# Patient Record
Sex: MALE | Race: ASIAN | NOT HISPANIC OR LATINO
[De-identification: names, ages, dates, MRNs, and addresses within clinical notes are randomized per-mention and may not be internally consistent; named-entity substitution may affect disease eponyms.]

---

## 2017-07-12 ENCOUNTER — APPOINTMENT (OUTPATIENT)
Dept: CARDIOLOGY | Facility: CLINIC | Age: 62
End: 2017-07-12

## 2017-07-12 VITALS
DIASTOLIC BLOOD PRESSURE: 74 MMHG | WEIGHT: 188 LBS | SYSTOLIC BLOOD PRESSURE: 129 MMHG | RESPIRATION RATE: 18 BRPM | HEART RATE: 69 BPM | OXYGEN SATURATION: 96 % | BODY MASS INDEX: 27.53 KG/M2

## 2018-02-27 ENCOUNTER — APPOINTMENT (OUTPATIENT)
Dept: CARDIOLOGY | Facility: CLINIC | Age: 63
End: 2018-02-27

## 2018-05-16 ENCOUNTER — APPOINTMENT (OUTPATIENT)
Dept: CARDIOLOGY | Facility: CLINIC | Age: 63
End: 2018-05-16
Payer: MEDICAID

## 2018-05-16 ENCOUNTER — NON-APPOINTMENT (OUTPATIENT)
Age: 63
End: 2018-05-16

## 2018-05-16 VITALS
SYSTOLIC BLOOD PRESSURE: 120 MMHG | TEMPERATURE: 98.6 F | DIASTOLIC BLOOD PRESSURE: 65 MMHG | OXYGEN SATURATION: 96 % | WEIGHT: 198 LBS | BODY MASS INDEX: 28.99 KG/M2 | HEART RATE: 77 BPM | RESPIRATION RATE: 18 BRPM

## 2018-05-16 PROCEDURE — 99214 OFFICE O/P EST MOD 30 MIN: CPT

## 2018-05-16 PROCEDURE — 93000 ELECTROCARDIOGRAM COMPLETE: CPT

## 2018-05-24 ENCOUNTER — OTHER (OUTPATIENT)
Age: 63
End: 2018-05-24

## 2018-06-06 ENCOUNTER — APPOINTMENT (OUTPATIENT)
Dept: CARDIOLOGY | Facility: CLINIC | Age: 63
End: 2018-06-06
Payer: MEDICAID

## 2018-06-06 LAB
INR PPP: 2.8 RATIO
POCT-PROTHROMBIN TIME: 34 SECS
QUALITY CONTROL: YES

## 2018-06-06 PROCEDURE — 85610 PROTHROMBIN TIME: CPT | Mod: QW

## 2018-06-06 PROCEDURE — 99211 OFF/OP EST MAY X REQ PHY/QHP: CPT

## 2018-06-20 ENCOUNTER — APPOINTMENT (OUTPATIENT)
Dept: CARDIOLOGY | Facility: CLINIC | Age: 63
End: 2018-06-20
Payer: MEDICAID

## 2018-06-20 VITALS
RESPIRATION RATE: 17 BRPM | TEMPERATURE: 98.4 F | OXYGEN SATURATION: 97 % | HEART RATE: 98 BPM | SYSTOLIC BLOOD PRESSURE: 126 MMHG | DIASTOLIC BLOOD PRESSURE: 86 MMHG

## 2018-06-20 DIAGNOSIS — Z79.01 LONG TERM (CURRENT) USE OF ANTICOAGULANTS: ICD-10-CM

## 2018-06-20 LAB
INR PPP: 1.8 RATIO
POCT-PROTHROMBIN TIME: 21.6 SECS

## 2018-06-20 PROCEDURE — 93793 ANTICOAG MGMT PT WARFARIN: CPT

## 2018-06-20 PROCEDURE — 85610 PROTHROMBIN TIME: CPT | Mod: QW

## 2018-07-09 ENCOUNTER — RESULT CHARGE (OUTPATIENT)
Age: 63
End: 2018-07-09

## 2018-07-09 ENCOUNTER — RESULT REVIEW (OUTPATIENT)
Age: 63
End: 2018-07-09

## 2018-07-09 ENCOUNTER — APPOINTMENT (OUTPATIENT)
Dept: CARDIOLOGY | Facility: CLINIC | Age: 63
End: 2018-07-09
Payer: MEDICAID

## 2018-07-09 VITALS
OXYGEN SATURATION: 97 % | RESPIRATION RATE: 18 BRPM | DIASTOLIC BLOOD PRESSURE: 69 MMHG | HEART RATE: 88 BPM | SYSTOLIC BLOOD PRESSURE: 106 MMHG

## 2018-07-09 PROCEDURE — 85610 PROTHROMBIN TIME: CPT | Mod: QW

## 2018-07-09 PROCEDURE — 93793 ANTICOAG MGMT PT WARFARIN: CPT

## 2018-07-10 LAB
INR PPP: 1.6 RATIO
POCT-PROTHROMBIN TIME: 18.7 SECS
QUALITY CONTROL: YES

## 2018-07-25 ENCOUNTER — APPOINTMENT (OUTPATIENT)
Dept: CARDIOLOGY | Facility: CLINIC | Age: 63
End: 2018-07-25
Payer: MEDICAID

## 2018-07-25 VITALS
OXYGEN SATURATION: 97 % | DIASTOLIC BLOOD PRESSURE: 70 MMHG | TEMPERATURE: 98.6 F | RESPIRATION RATE: 17 BRPM | HEART RATE: 82 BPM | SYSTOLIC BLOOD PRESSURE: 110 MMHG

## 2018-07-25 LAB
INR PPP: 1.5 RATIO
POCT-PROTHROMBIN TIME: 17.6 SECS
QUALITY CONTROL: YES

## 2018-07-25 PROCEDURE — 93793 ANTICOAG MGMT PT WARFARIN: CPT

## 2018-07-25 PROCEDURE — 85610 PROTHROMBIN TIME: CPT | Mod: QW

## 2018-08-08 ENCOUNTER — APPOINTMENT (OUTPATIENT)
Dept: CARDIOLOGY | Facility: CLINIC | Age: 63
End: 2018-08-08
Payer: MEDICAID

## 2018-08-08 VITALS
TEMPERATURE: 99 F | SYSTOLIC BLOOD PRESSURE: 130 MMHG | OXYGEN SATURATION: 98 % | DIASTOLIC BLOOD PRESSURE: 80 MMHG | RESPIRATION RATE: 17 BRPM | HEART RATE: 76 BPM

## 2018-08-08 PROCEDURE — 85610 PROTHROMBIN TIME: CPT | Mod: QW

## 2018-08-08 PROCEDURE — 93793 ANTICOAG MGMT PT WARFARIN: CPT

## 2018-08-11 LAB
INR PPP: 2.2 RATIO
POCT-PROTHROMBIN TIME: 26.2 SECS
QUALITY CONTROL: YES

## 2018-08-13 ENCOUNTER — APPOINTMENT (OUTPATIENT)
Dept: CARDIOLOGY | Facility: CLINIC | Age: 63
End: 2018-08-13

## 2018-09-12 ENCOUNTER — APPOINTMENT (OUTPATIENT)
Dept: CARDIOLOGY | Facility: CLINIC | Age: 63
End: 2018-09-12

## 2019-05-15 ENCOUNTER — APPOINTMENT (OUTPATIENT)
Dept: CARDIOLOGY | Facility: CLINIC | Age: 64
End: 2019-05-15
Payer: MEDICAID

## 2019-05-15 ENCOUNTER — RX RENEWAL (OUTPATIENT)
Age: 64
End: 2019-05-15

## 2019-05-16 ENCOUNTER — APPOINTMENT (OUTPATIENT)
Dept: CARDIOLOGY | Facility: CLINIC | Age: 64
End: 2019-05-16
Payer: MEDICAID

## 2019-05-16 VITALS
BODY MASS INDEX: 27.53 KG/M2 | SYSTOLIC BLOOD PRESSURE: 124 MMHG | RESPIRATION RATE: 17 BRPM | OXYGEN SATURATION: 95 % | TEMPERATURE: 98.8 F | HEART RATE: 77 BPM | DIASTOLIC BLOOD PRESSURE: 78 MMHG | WEIGHT: 188 LBS

## 2019-05-16 PROCEDURE — 93306 TTE W/DOPPLER COMPLETE: CPT

## 2019-05-16 PROCEDURE — 99214 OFFICE O/P EST MOD 30 MIN: CPT

## 2019-05-16 RX ORDER — WARFARIN 3 MG/1
3 TABLET ORAL
Qty: 30 | Refills: 5 | Status: DISCONTINUED | COMMUNITY
Start: 2018-05-24 | End: 2019-05-16

## 2019-05-18 NOTE — DISCUSSION/SUMMARY
[FreeTextEntry1] : 62-year-old male smoker with stroke in 1/2018, aortic aneurysm (4.1 cm), HTN and HLD presents for followup.  Patient was last seen on 7/12/17.  Patient had a stroke on 1/16/18 (left arm numbness).  Brain MRI showed a small focal infarct involving the right postcentral gyrus.  He was started on ASA 81 mg.  Patient denies CP, SOB, or palpitations.  \par \par (1) PAF, stroke - Patient is in AFIB today.  I advised patient to start Eliquis 5 mg BID but it is not covered by insurance.  I advised patient to start Warfarin 3 mg QD.  He should stop ASA.  I will check INR in 1 week.\par \par (2) Aortic aneurysm - Patient is stable.  Patient should have a followup echo in July 2018. \par \par (3) HTN - His BP was good today.  I advised patient to continue Metoprolol  mg, Losartan HCTZ 100-12.5 and Amlodipine 10 mg. \par \par (4) Followup - INR check in1 week.

## 2019-07-25 ENCOUNTER — MEDICATION RENEWAL (OUTPATIENT)
Age: 64
End: 2019-07-25

## 2019-07-25 RX ORDER — RIVAROXABAN 15 MG/1
15 TABLET, FILM COATED ORAL
Qty: 90 | Refills: 1 | Status: ACTIVE | COMMUNITY
Start: 2019-05-17 | End: 1900-01-01

## 2019-08-20 RX ORDER — APIXABAN 5 MG/1
5 TABLET, FILM COATED ORAL
Qty: 60 | Refills: 5 | Status: DISCONTINUED | COMMUNITY
Start: 2018-05-16 | End: 2019-08-20

## 2021-03-06 NOTE — PHYSICAL EXAM
[General Appearance - Well Developed] : well developed [Normal Appearance] : normal appearance [Well Groomed] : well groomed [General Appearance - Well Nourished] : well nourished [No Deformities] : no deformities [General Appearance - In No Acute Distress] : no acute distress [Normal Conjunctiva] : the conjunctiva exhibited no abnormalities [Eyelids - No Xanthelasma] : the eyelids demonstrated no xanthelasmas [Normal Oral Mucosa] : normal oral mucosa [No Oral Pallor] : no oral pallor [No Oral Cyanosis] : no oral cyanosis [Normal Jugular Venous A Waves Present] : normal jugular venous A waves present [Normal Jugular Venous V Waves Present] : normal jugular venous V waves present [No Jugular Venous Yang A Waves] : no jugular venous yang A waves [Respiration, Rhythm And Depth] : normal respiratory rhythm and effort [Exaggerated Use Of Accessory Muscles For Inspiration] : no accessory muscle use [Auscultation Breath Sounds / Voice Sounds] : lungs were clear to auscultation bilaterally [Heart Sounds] : normal S1 and S2 [Murmurs] : no murmurs present [Arterial Pulses Normal] : the arterial pulses were normal [Edema] : no peripheral edema present [Irregularly Irregular] : the rhythm was irregularly irregular [Bowel Sounds] : normal bowel sounds [Abdomen Soft] : soft [Abdomen Tenderness] : non-tender [Abnormal Walk] : normal gait [Nail Clubbing] : no clubbing of the fingernails [Cyanosis, Localized] : no localized cyanosis [Petechial Hemorrhages (___cm)] : no petechial hemorrhages [] : no ischemic changes [Oriented To Time, Place, And Person] : oriented to person, place, and time [Affect] : the affect was normal [Mood] : the mood was normal [No Anxiety] : not feeling anxious

## 2021-03-12 ENCOUNTER — APPOINTMENT (OUTPATIENT)
Dept: CARDIOLOGY | Facility: CLINIC | Age: 66
End: 2021-03-12
Payer: COMMERCIAL

## 2021-03-12 ENCOUNTER — NON-APPOINTMENT (OUTPATIENT)
Age: 66
End: 2021-03-12

## 2021-03-12 VITALS
OXYGEN SATURATION: 94 % | BODY MASS INDEX: 26.65 KG/M2 | DIASTOLIC BLOOD PRESSURE: 73 MMHG | HEART RATE: 81 BPM | WEIGHT: 182 LBS | RESPIRATION RATE: 17 BRPM | SYSTOLIC BLOOD PRESSURE: 115 MMHG | TEMPERATURE: 97.9 F

## 2021-03-12 DIAGNOSIS — R06.02 SHORTNESS OF BREATH: ICD-10-CM

## 2021-03-12 PROCEDURE — 93306 TTE W/DOPPLER COMPLETE: CPT

## 2021-03-12 PROCEDURE — 99072 ADDL SUPL MATRL&STAF TM PHE: CPT

## 2021-03-12 PROCEDURE — 99214 OFFICE O/P EST MOD 30 MIN: CPT | Mod: 25

## 2021-03-12 PROCEDURE — 93000 ELECTROCARDIOGRAM COMPLETE: CPT

## 2021-05-13 ENCOUNTER — NON-APPOINTMENT (OUTPATIENT)
Age: 66
End: 2021-05-13

## 2021-08-12 DIAGNOSIS — Z01.818 ENCOUNTER FOR OTHER PREPROCEDURAL EXAMINATION: ICD-10-CM

## 2021-08-13 ENCOUNTER — APPOINTMENT (OUTPATIENT)
Dept: DISASTER EMERGENCY | Facility: CLINIC | Age: 66
End: 2021-08-13

## 2021-08-14 LAB — SARS-COV-2 N GENE NPH QL NAA+PROBE: NOT DETECTED

## 2021-08-17 ENCOUNTER — APPOINTMENT (OUTPATIENT)
Dept: PULMONOLOGY | Facility: CLINIC | Age: 66
End: 2021-08-17
Payer: COMMERCIAL

## 2021-08-17 VITALS — HEART RATE: 80 BPM | SYSTOLIC BLOOD PRESSURE: 120 MMHG | DIASTOLIC BLOOD PRESSURE: 71 MMHG

## 2021-08-17 DIAGNOSIS — F17.200 NICOTINE DEPENDENCE, UNSPECIFIED, UNCOMPLICATED: ICD-10-CM

## 2021-08-17 PROCEDURE — 99407 BEHAV CHNG SMOKING > 10 MIN: CPT

## 2021-08-17 PROCEDURE — ZZZZZ: CPT

## 2021-08-17 PROCEDURE — 99204 OFFICE O/P NEW MOD 45 MIN: CPT | Mod: 25

## 2021-08-17 PROCEDURE — 94010 BREATHING CAPACITY TEST: CPT

## 2021-08-17 PROCEDURE — 94726 PLETHYSMOGRAPHY LUNG VOLUMES: CPT

## 2021-08-17 PROCEDURE — 94729 DIFFUSING CAPACITY: CPT

## 2021-08-17 NOTE — HISTORY OF PRESENT ILLNESS
[FreeTextEntry8] : Patient is a 65 y o Male with PMH of HTN, HLD, A fib on xarelto, CVA 5 y ago and COVID 19 comes in c/o lump in the lung found on CT scan in februrary of 2019 when he had COVID 19. Described the "lump" as "black spot" He was hospitalized at \par Was referred by his PCP 2 and a half months ago, for further evaluation with CT scan and treatment.\par \par No complaints today:\marge Is a current smoker, 45py hx, no screening, no Pneumovax.\par COVID vaccine: Pfizer 7/10//2021 and 8/2/2021\par \par \par \par \par

## 2021-08-17 NOTE — ASSESSMENT
[FreeTextEntry1] : 65 y M male PMH HTN, HLD, CVA and COVID, 45 pack year smoking hx in february comes in for evaluation of abnormal CT chest, report significant for COVID related lung changes that limit the imaging of underlying masses. Has no symptoms today. PFTs and physical exam suggest emphysema.\par

## 2021-08-17 NOTE — PLAN
[FreeTextEntry1] : - repeat CT\par -Smoking cessation\par -Lung cancer screening\par -f/u with PCP for pneumovax

## 2021-08-17 NOTE — PHYSICAL EXAM
[No Acute Distress] : no acute distress [Well Nourished] : well nourished [Well Developed] : well developed [Well-Appearing] : well-appearing [Normal Sclera/Conjunctiva] : normal sclera/conjunctiva [PERRL] : pupils equal round and reactive to light [EOMI] : extraocular movements intact [Normal Outer Ear/Nose] : the outer ears and nose were normal in appearance [Normal Oropharynx] : the oropharynx was normal [No JVD] : no jugular venous distention [No Lymphadenopathy] : no lymphadenopathy [Supple] : supple [Thyroid Normal, No Nodules] : the thyroid was normal and there were no nodules present [No Respiratory Distress] : no respiratory distress  [No Accessory Muscle Use] : no accessory muscle use [Clear to Auscultation] : lungs were clear to auscultation bilaterally [Normal Rate] : normal rate  [Regular Rhythm] : with a regular rhythm [Normal S1, S2] : normal S1 and S2 [No Murmur] : no murmur heard [No Carotid Bruits] : no carotid bruits [No Abdominal Bruit] : a ~M bruit was not heard ~T in the abdomen [No Varicosities] : no varicosities [Pedal Pulses Present] : the pedal pulses are present [No Edema] : there was no peripheral edema [No Palpable Aorta] : no palpable aorta [No Extremity Clubbing/Cyanosis] : no extremity clubbing/cyanosis [Soft] : abdomen soft [Non Tender] : non-tender [Non-distended] : non-distended [No Masses] : no abdominal mass palpated [No HSM] : no HSM [Normal Bowel Sounds] : normal bowel sounds [Normal Posterior Cervical Nodes] : no posterior cervical lymphadenopathy [Normal Anterior Cervical Nodes] : no anterior cervical lymphadenopathy [No CVA Tenderness] : no CVA  tenderness [No Spinal Tenderness] : no spinal tenderness [No Joint Swelling] : no joint swelling [Grossly Normal Strength/Tone] : grossly normal strength/tone [No Rash] : no rash [Coordination Grossly Intact] : coordination grossly intact [No Focal Deficits] : no focal deficits [Normal Gait] : normal gait [Deep Tendon Reflexes (DTR)] : deep tendon reflexes were 2+ and symmetric [Normal Affect] : the affect was normal [Normal Insight/Judgement] : insight and judgment were intact [de-identified] : Increased AP diameter

## 2021-08-17 NOTE — HEALTH RISK ASSESSMENT
[] : Yes [20 or more] : 20 or more [4 or more  times a week (4 pts)] : 4 or more  times a week (4 points) [1 or 2 (0 pts)] : 1 or 2 (0 points) [Less than monthly (1 pt)] : Less than monthly (1 point) [One fall no injury in past year] : Patient reported one fall in the past year without injury [0] : 2) Feeling down, depressed, or hopeless: Not at all (0) [de-identified] : PCP [de-identified] : no plans to quit [de-identified] : No would like to join some gym  [de-identified] : E[facility] 62

## 2021-08-17 NOTE — HEALTH RISK ASSESSMENT
[] : Yes [20 or more] : 20 or more [4 or more  times a week (4 pts)] : 4 or more  times a week (4 points) [1 or 2 (0 pts)] : 1 or 2 (0 points) [Less than monthly (1 pt)] : Less than monthly (1 point) [One fall no injury in past year] : Patient reported one fall in the past year without injury [0] : 2) Feeling down, depressed, or hopeless: Not at all (0) [de-identified] : PCP [de-identified] : no plans to quit [de-identified] : No would like to join some gym  [de-identified] : Eats whatever [facility]] : 62

## 2021-08-17 NOTE — COUNSELING
[ - Annual Lung Cancer Screening/Share Decision Making Discussion] : Annual Lung Cancer Screening/Share Decision Making Discussion. (I have advised this patient to have a Low Dose CT (LDCT) scan of the lungs and have discussed the following with the patient in a shared decision making discussion:   Benefits of Detection and Early Treatment: There is adequate evidence that annual screening for lung cancer with LDCT in a population of high-risk persons can prevent a substantial number of lung cancer–related deaths. The magnitude of benefit depends on the individual patient's risk for lung cancer, as those who are at highest risk are most likely to benefit. Screening cannot prevent most lung cancer–related deaths, and does not replace smoking cessation. Harms of Detection and Early Intervention and Treatment: The harms associated with LDCT screening include false-negative and false-positive results, incidental findings, over diagnosis, and radiation exposure. False-positive LDCT results occur in a substantial proportion of screened persons; 95% of all positive results do not lead to a diagnosis of cancer. In a high-quality screening program, further imaging can resolve most false-positive results; however, some patients may require invasive procedures. Radiation harms, including cancer resulting from cumulative exposure to radiation, vary depending on the age at the start of screening; the number of scans received; and the person's exposure to other sources of radiation, particularly other medical imaging.) [Risk of tobacco use and health benefits of smoking cessation discussed] : Risk of tobacco use and health benefits of smoking cessation discussed [Cessation strategies including cessation program discussed] : Cessation strategies including cessation program discussed [Use of nicotine replacement therapies and other medications discussed] : Use of nicotine replacement therapies and other medications discussed [Encouraged to pick a quit date and identify support needed to quit] : Encouraged to pick a quit date and identify support needed to quit [AUDIT-C Screening administered and reviewed] : AUDIT-C Screening administered and reviewed [Hazards of at-risk alcohol use discussed] : Hazards of at-risk alcohol use discussed [Strategies to reduce or eliminate alcohol use discussed] : Strategies to reduce or eliminate alcohol use discussed [Support options provided] : Support options provided [Encouraged to maintain food diary] : Encouraged to maintain food diary [Encouraged to increase physical activity] : Encouraged to increase physical activity

## 2021-08-17 NOTE — COUNSELING
[ - Annual Lung Cancer Screening/Share Decision Making Discussion] : Annual Lung Cancer Screening/Share Decision Making Discussion. (I have advised this patient to have a Low Dose CT (LDCT) scan of the lungs and have discussed the following with the patient in a shared decision making discussion:   Benefits of Detection and Early Treatment: There is adequate evidence that annual screening for lung cancer with LDCT in a population of high-risk persons can prevent a substantial number of lung cancer–related deaths. The magnitude of benefit depends on the individual patient's risk for lung cancer, as those who are at highest risk are most likely to benefit. Screening cannot prevent most lung cancer–related deaths, and does not replace smoking cessation. Harms of Detection and Early Intervention and Treatment: The harms associated with LDCT screening include false-negative and false-positive results, incidental findings, over diagnosis, and radiation exposure. False-positive LDCT results occur in a substantial proportion of screened persons; 95% of all positive results do not lead to a diagnosis of cancer. In a high-quality screening program, further imaging can resolve most false-positive results; however, some patients may require invasive procedures. Radiation harms, including cancer resulting from cumulative exposure to radiation, vary depending on the age at the start of screening; the number of scans received; and the person's exposure to other sources of radiation, particularly other medical imaging.) [Risk of tobacco use and health benefits of smoking cessation discussed] : Risk of tobacco use and health benefits of smoking cessation discussed [Cessation strategies including cessation program discussed] : Cessation strategies including cessation program discussed [Use of nicotine replacement therapies and other medications discussed] : Use of nicotine replacement therapies and other medications discussed [Encouraged to pick a quit date and identify support needed to quit] : Encouraged to pick a quit date and identify support needed to quit [AUDIT-C Screening administered and reviewed] : AUDIT-C Screening administered and reviewed [Hazards of at-risk alcohol use discussed] : Hazards of at-risk alcohol use discussed [Support options provided] : Support options provided [Strategies to reduce or eliminate alcohol use discussed] : Strategies to reduce or eliminate alcohol use discussed [Encouraged to maintain food diary] : Encouraged to maintain food diary [Encouraged to increase physical activity] : Encouraged to increase physical activity

## 2021-08-17 NOTE — PHYSICAL EXAM
[No Acute Distress] : no acute distress [Well Nourished] : well nourished [Well Developed] : well developed [Well-Appearing] : well-appearing [Normal Sclera/Conjunctiva] : normal sclera/conjunctiva [PERRL] : pupils equal round and reactive to light [EOMI] : extraocular movements intact [Normal Outer Ear/Nose] : the outer ears and nose were normal in appearance [Normal Oropharynx] : the oropharynx was normal [No JVD] : no jugular venous distention [No Lymphadenopathy] : no lymphadenopathy [Supple] : supple [Thyroid Normal, No Nodules] : the thyroid was normal and there were no nodules present [No Respiratory Distress] : no respiratory distress  [No Accessory Muscle Use] : no accessory muscle use [Clear to Auscultation] : lungs were clear to auscultation bilaterally [Normal Rate] : normal rate  [Regular Rhythm] : with a regular rhythm [Normal S1, S2] : normal S1 and S2 [No Murmur] : no murmur heard [No Carotid Bruits] : no carotid bruits [No Abdominal Bruit] : a ~M bruit was not heard ~T in the abdomen [No Varicosities] : no varicosities [Pedal Pulses Present] : the pedal pulses are present [No Edema] : there was no peripheral edema [No Palpable Aorta] : no palpable aorta [Soft] : abdomen soft [No Extremity Clubbing/Cyanosis] : no extremity clubbing/cyanosis [Non Tender] : non-tender [Non-distended] : non-distended [No Masses] : no abdominal mass palpated [No HSM] : no HSM [Normal Bowel Sounds] : normal bowel sounds [Normal Posterior Cervical Nodes] : no posterior cervical lymphadenopathy [No CVA Tenderness] : no CVA  tenderness [Normal Anterior Cervical Nodes] : no anterior cervical lymphadenopathy [No Spinal Tenderness] : no spinal tenderness [No Joint Swelling] : no joint swelling [Grossly Normal Strength/Tone] : grossly normal strength/tone [No Rash] : no rash [Coordination Grossly Intact] : coordination grossly intact [No Focal Deficits] : no focal deficits [Normal Gait] : normal gait [Deep Tendon Reflexes (DTR)] : deep tendon reflexes were 2+ and symmetric [Normal Affect] : the affect was normal [de-identified] : Increased AP diameter [Normal Insight/Judgement] : insight and judgment were intact

## 2021-09-20 ENCOUNTER — APPOINTMENT (OUTPATIENT)
Dept: CT IMAGING | Facility: HOSPITAL | Age: 66
End: 2021-09-20
Payer: COMMERCIAL

## 2021-09-20 ENCOUNTER — OUTPATIENT (OUTPATIENT)
Dept: OUTPATIENT SERVICES | Facility: HOSPITAL | Age: 66
LOS: 1 days | End: 2021-09-20
Payer: MEDICAID

## 2021-09-20 DIAGNOSIS — R93.89 ABNORMAL FINDINGS ON DIAGNOSTIC IMAGING OF OTHER SPECIFIED BODY STRUCTURES: ICD-10-CM

## 2021-09-20 DIAGNOSIS — R06.02 SHORTNESS OF BREATH: ICD-10-CM

## 2021-09-20 PROCEDURE — 71250 CT THORAX DX C-: CPT

## 2021-09-20 PROCEDURE — 71250 CT THORAX DX C-: CPT | Mod: 26

## 2021-09-30 ENCOUNTER — APPOINTMENT (OUTPATIENT)
Dept: PULMONOLOGY | Facility: CLINIC | Age: 66
End: 2021-09-30
Payer: COMMERCIAL

## 2021-09-30 VITALS
HEART RATE: 69 BPM | SYSTOLIC BLOOD PRESSURE: 133 MMHG | HEIGHT: 69.29 IN | DIASTOLIC BLOOD PRESSURE: 80 MMHG | RESPIRATION RATE: 16 BRPM | WEIGHT: 191 LBS | TEMPERATURE: 97.4 F | OXYGEN SATURATION: 97 % | BODY MASS INDEX: 27.97 KG/M2

## 2021-09-30 DIAGNOSIS — J43.1 PANLOBULAR EMPHYSEMA: ICD-10-CM

## 2021-09-30 DIAGNOSIS — R93.89 ABNORMAL FINDINGS ON DIAGNOSTIC IMAGING OF OTHER SPECIFIED BODY STRUCTURES: ICD-10-CM

## 2021-09-30 PROCEDURE — 99214 OFFICE O/P EST MOD 30 MIN: CPT

## 2021-10-01 PROBLEM — R93.89 ABNORMAL CHEST CT: Status: ACTIVE | Noted: 2021-08-17

## 2021-10-01 PROBLEM — J43.1 PANLOBULAR EMPHYSEMA: Status: ACTIVE | Noted: 2021-10-01

## 2021-10-01 NOTE — HISTORY OF PRESENT ILLNESS
[FreeTextEntry1] : f/u CT chest imaging  [de-identified] : Patient is a 64 y/o male with PMHx HTN, HLD, Afib on xarelto, CVA 5 years ago, and a prior COVID-19 infection February 2021 requiring hospitalization, no supplemental O2 or steroid intervention needed, presenting to the clinic for f/u on CT chest and PFT results. Patient is an active smoker >45py with multiple unsuccessful attempts at quitting, currently smoking 1 cig/hr. Patient denies SOB,  MONTOYA, or recent illnesses. COVID-19 vaccine X2 doses July- August 2021, no adverse reaction. \par PFTs show mild obstruction with mild diffusion defect consistent with smoking hx, suggesting COPD- emphysema type. \par CT chest show emphysematous lungs changes, consistent with smoking hx. \par  [FreeTextEntry8] : Patient is a 65 y o Male with PMH of HTN, HLD, A fib on xarelto, CVA 5 y ago and COVID 19 comes in c/o lump in the lung found on CT scan in februrary of 2019 when he had COVID 19. Described the "lump" as "black spot" He was hospitalized at \par Was referred by his PCP 2 and a half months ago, for further evaluation with CT scan and treatment.\par \par No complaints today:\marge Is a current smoker, 45py hx, no screening, no Pneumovax.\par COVID vaccine: Pfizer 7/10//2021 and 8/2/2021\par \par \par \par \par

## 2021-10-01 NOTE — ASSESSMENT
[FreeTextEntry1] : 65 y M male smoker, with extensive centrilobular and paraseptal emphysema with areas of fibrotic changes.\par PFT with mild obstruction and mild diffusion defect.\par \par Discussed smoking cessation at length, offered Rx, pt states he is not ready and will return when he is. He understands his lung disease will progress when smoking. Currently grossly asymptomatic.\par Plan for yearly CT\par RTC in 6 months for f/u.\par

## 2021-10-01 NOTE — COUNSELING
[Risk of tobacco use and health benefits of smoking cessation discussed] : Risk of tobacco use and health benefits of smoking cessation discussed [Cessation strategies including cessation program discussed] : Cessation strategies including cessation program discussed [Use of nicotine replacement therapies and other medications discussed] : Use of nicotine replacement therapies and other medications discussed [Encouraged to pick a quit date and identify support needed to quit] : Encouraged to pick a quit date and identify support needed to quit [AUDIT-C Screening administered and reviewed] : AUDIT-C Screening administered and reviewed [Hazards of at-risk alcohol use discussed] : Hazards of at-risk alcohol use discussed [Strategies to reduce or eliminate alcohol use discussed] : Strategies to reduce or eliminate alcohol use discussed [Support options provided] : Support options provided [Encouraged to maintain food diary] : Encouraged to maintain food diary [Encouraged to increase physical activity] : Encouraged to increase physical activity

## 2021-10-01 NOTE — HEALTH RISK ASSESSMENT
[] : Yes [20 or more] : 20 or more [4 or more  times a week (4 pts)] : 4 or more  times a week (4 points) [1 or 2 (0 pts)] : 1 or 2 (0 points) [Less than monthly (1 pt)] : Less than monthly (1 point) [One fall no injury in past year] : Patient reported one fall in the past year without injury [0] : 2) Feeling down, depressed, or hopeless: Not at all (0) [de-identified] : PCP [de-identified] : no plans to quit [de-identified] : No would like to join some gym  [de-identified] : Eats whatever [facility] : 62

## 2021-10-01 NOTE — PHYSICAL EXAM
[Kyphosis] : no kyphosis [Scoliosis] : no scoliosis [Normal] : affect was normal and insight and judgment were intact [No Acute Distress] : no acute distress [Well Nourished] : well nourished [Well Developed] : well developed [Well-Appearing] : well-appearing [Normal Sclera/Conjunctiva] : normal sclera/conjunctiva [PERRL] : pupils equal round and reactive to light [EOMI] : extraocular movements intact [Normal Outer Ear/Nose] : the outer ears and nose were normal in appearance [Normal Oropharynx] : the oropharynx was normal [No JVD] : no jugular venous distention [No Lymphadenopathy] : no lymphadenopathy [Supple] : supple [Thyroid Normal, No Nodules] : the thyroid was normal and there were no nodules present [No Respiratory Distress] : no respiratory distress  [No Accessory Muscle Use] : no accessory muscle use [Clear to Auscultation] : lungs were clear to auscultation bilaterally [Normal Rate] : normal rate  [Regular Rhythm] : with a regular rhythm [Normal S1, S2] : normal S1 and S2 [No Murmur] : no murmur heard [No Carotid Bruits] : no carotid bruits [No Abdominal Bruit] : a ~M bruit was not heard ~T in the abdomen [No Varicosities] : no varicosities [Pedal Pulses Present] : the pedal pulses are present [No Edema] : there was no peripheral edema [No Palpable Aorta] : no palpable aorta [No Extremity Clubbing/Cyanosis] : no extremity clubbing/cyanosis [Soft] : abdomen soft [Non Tender] : non-tender [Non-distended] : non-distended [No Masses] : no abdominal mass palpated [No HSM] : no HSM [Normal Bowel Sounds] : normal bowel sounds [Normal Posterior Cervical Nodes] : no posterior cervical lymphadenopathy [Normal Anterior Cervical Nodes] : no anterior cervical lymphadenopathy [No CVA Tenderness] : no CVA  tenderness [No Spinal Tenderness] : no spinal tenderness [No Joint Swelling] : no joint swelling [Grossly Normal Strength/Tone] : grossly normal strength/tone [No Rash] : no rash [Coordination Grossly Intact] : coordination grossly intact [No Focal Deficits] : no focal deficits [Normal Gait] : normal gait [Deep Tendon Reflexes (DTR)] : deep tendon reflexes were 2+ and symmetric [Normal Affect] : the affect was normal [Normal Insight/Judgement] : insight and judgment were intact [de-identified] : Increased AP diameter

## 2021-10-01 NOTE — HEALTH RISK ASSESSMENT
Statement Selected [] : Yes [20 or more] : 20 or more [4 or more  times a week (4 pts)] : 4 or more  times a week (4 points) [1 or 2 (0 pts)] : 1 or 2 (0 points) [Less than monthly (1 pt)] : Less than monthly (1 point) [One fall no injury in past year] : Patient reported one fall in the past year without injury [0] : 2) Feeling down, depressed, or hopeless: Not at all (0) [de-identified] : PCP [de-identified] : no plans to quit [de-identified] : No would like to join some gym  [de-identified] : Eats whatever [facility] : 62

## 2021-10-01 NOTE — PHYSICAL EXAM
[Kyphosis] : no kyphosis [Scoliosis] : no scoliosis [Normal] : affect was normal and insight and judgment were intact [No Acute Distress] : no acute distress [Well Nourished] : well nourished [Well Developed] : well developed [Well-Appearing] : well-appearing [Normal Sclera/Conjunctiva] : normal sclera/conjunctiva [PERRL] : pupils equal round and reactive to light [EOMI] : extraocular movements intact [Normal Outer Ear/Nose] : the outer ears and nose were normal in appearance [Normal Oropharynx] : the oropharynx was normal [No JVD] : no jugular venous distention [No Lymphadenopathy] : no lymphadenopathy [Supple] : supple [Thyroid Normal, No Nodules] : the thyroid was normal and there were no nodules present [No Respiratory Distress] : no respiratory distress  [No Accessory Muscle Use] : no accessory muscle use [Clear to Auscultation] : lungs were clear to auscultation bilaterally [Normal Rate] : normal rate  [Regular Rhythm] : with a regular rhythm [Normal S1, S2] : normal S1 and S2 [No Murmur] : no murmur heard [No Carotid Bruits] : no carotid bruits [No Abdominal Bruit] : a ~M bruit was not heard ~T in the abdomen [No Varicosities] : no varicosities [Pedal Pulses Present] : the pedal pulses are present [No Edema] : there was no peripheral edema [No Palpable Aorta] : no palpable aorta [No Extremity Clubbing/Cyanosis] : no extremity clubbing/cyanosis [Soft] : abdomen soft [Non Tender] : non-tender [Non-distended] : non-distended [No Masses] : no abdominal mass palpated [No HSM] : no HSM [Normal Bowel Sounds] : normal bowel sounds [Normal Posterior Cervical Nodes] : no posterior cervical lymphadenopathy [Normal Anterior Cervical Nodes] : no anterior cervical lymphadenopathy [No CVA Tenderness] : no CVA  tenderness [No Spinal Tenderness] : no spinal tenderness [No Joint Swelling] : no joint swelling [Grossly Normal Strength/Tone] : grossly normal strength/tone [No Rash] : no rash [Coordination Grossly Intact] : coordination grossly intact [No Focal Deficits] : no focal deficits [Normal Gait] : normal gait [Normal Affect] : the affect was normal [Deep Tendon Reflexes (DTR)] : deep tendon reflexes were 2+ and symmetric [Normal Insight/Judgement] : insight and judgment were intact [de-identified] : Increased AP diameter

## 2021-10-01 NOTE — HISTORY OF PRESENT ILLNESS
[FreeTextEntry1] : f/u CT chest imaging  [de-identified] : Patient is a 64 y/o male with PMHx HTN, HLD, Afib on xarelto, CVA 5 years ago, and a prior COVID-19 infection February 2021 requiring hospitalization, no supplemental O2 or steroid intervention needed, presenting to the clinic for f/u on CT chest and PFT results. Patient is an active smoker >45py with multiple unsuccessful attempts at quitting, currently smoking 1 cig/hr. Patient denies SOB,  MONTOYA, or recent illnesses. COVID-19 vaccine X2 doses July- August 2021, no adverse reaction. \par PFTs show mild obstruction with mild diffusion defect consistent with smoking hx, suggesting COPD- emphysema type. \par CT chest show emphysematous lungs changes, consistent with smoking hx. \par  [FreeTextEntry8] : Patient is a 65 y o Male with PMH of HTN, HLD, A fib on xarelto, CVA 5 y ago and COVID 19 comes in c/o lump in the lung found on CT scan in februrary of 2019 when he had COVID 19. Described the "lump" as "black spot" He was hospitalized at \par Was referred by his PCP 2 and a half months ago, for further evaluation with CT scan and treatment.\par \par No complaints today:\marge Is a current smoker, 45py hx, no screening, no Pneumovax.\par COVID vaccine: Pfizer 7/10//2021 and 8/2/2021\par \par \par \par \par

## 2021-10-01 NOTE — REVIEW OF SYSTEMS
[Fever] : no fever [Chills] : no chills [Fatigue] : no fatigue [Recent Change In Weight] : ~T no recent weight change [Chest Pain] : no chest pain [Lower Ext Edema] : no lower extremity edema [Orthopena] : no orthopnea [Shortness Of Breath] : no shortness of breath [Wheezing] : no wheezing [Cough] : cough [Dyspnea on Exertion] : not dyspnea on exertion [Abdominal Pain] : no abdominal pain [Nausea] : no nausea [Diarrhea] : no diarrhea [Vomiting] : no vomiting [FreeTextEntry6] : after smoking, chronic, nonproductive  [Negative] : Heme/Lymph

## 2022-04-02 NOTE — HISTORY OF PRESENT ILLNESS
[FreeTextEntry1] : \par 3/12/21- Patient contracted COVID19 and went to hospital ER on  2/23/21 but was not admitted. Patient has AFIB and is on Xarelto 15 mg.  Patient denies CP, SOB, palpitations, or lightheadedness.  He is on Metoprolol  mg, Losartan HCTZ 100-12.5 mg, Simvastatin 20 mg. I advised patient to undergo an echocardiogram.  Patient underwent an echocardiogram and it showed normal LV function, mild aortic root dilatation (4.3 cm), mild dilatation of the ascending aorta. (4.1 cm), without significant valvular pathology. \par \par 5/16/19 - Patient stopped taking Warfarin 6 months ago and is only on Aspirin now. Patient denies CP, SOB, palpitations, or lightheadedness. I advised patient to undergo an echocardiogram.  Patient underwent an echocardiogram and it showed normal LV function, mild aortic root dilatation (4.2 cm), without significant valvular pathology.

## 2022-04-02 NOTE — DISCUSSION/SUMMARY
[FreeTextEntry1] : 65-year-old male smoker with PAF, stroke in 1/2018, aortic aneurysm (4.2 cm), HTN, HLD, presents for followup.  \par \par Patient was last seen on 5/16/19.  He was no longer on Warfarin and was on ASA only.   I advised patient to start Xarelto 15 mg.  Patient underwent an echocardiogram and it showed normal LV function, mild aortic root dilatation (4.2 cm), without significant valvular pathology. \par \par He is on Metoprolol  mg, Losartan HCTZ 100-12.5 for HTN.  He is no longer taking Amlodipine 10 mg. \par \par Last echo was on 5/16/19  and it showed normal LV function without significant valvular pathology.  Patient was noted to have mild aortic root dilatation (4.2 cm).\par \par 3/12/21- Patient contracted COVID19 and went to hospital ER on  2/23/21 but was not admitted. Patient has AFIB and is on Xarelto 15 mg.  Patient denies CP, SOB, palpitations, or lightheadedness.  He is on Metoprolol  mg, Losartan HCTZ 100-12.5 mg, Simvastatin 20 mg. I advised patient to undergo an echocardiogram.  Patient underwent an echocardiogram and it showed normal LV function, mild aortic root dilatation (4.3 cm), mild dilatation of the ascending aorta. (4.1 cm), without significant valvular pathology. \par \par (1) PAF, stroke - Patient has been stable.  Patient is in AFIB today.  I advised patient to continue Xarelto 15 mg for stroke prevention.  I advised patient to continue Metoprolol  mg for rate control.\par \par (2) Aortic aneurysm - Patient has been stable.  Patient underwent an echocardiogram and it showed normal LV function, mild aortic root dilatation (4.3 cm), mild dilatation of the ascending aorta. (4.1 cm), without significant valvular pathology.  I advised patient to keep BP under good control.  Patient should have a followup echo in 1 year. \par \par (3) HTN - His BP was good today.  I advised patient to continue Metoprolol  mg, Losartan HCTZ 100-12.5 mg.\par \par (4) Followup - 1 year.

## 2022-04-02 NOTE — REASON FOR VISIT
[FreeTextEntry1] : 65-year-old male smoker with PAF, stroke in 1/2018, aortic aneurysm (4.2 cm), HTN, HLD, presents for followup.  \par \par Patient was last seen on 5/16/19.  He was no longer on Warfarin and was on ASA only.   I advised patient to start Xarelto 15 mg.  Patient underwent an echocardiogram and it showed normal LV function, mild aortic root dilatation (4.2 cm), without significant valvular pathology. \par \par He is on Metoprolol  mg, Losartan HCTZ 100-12.5 for HTN.  He is no longer taking Amlodipine 10 mg. \par \par Last echo was on 5/16/19  and it showed normal LV function without significant valvular pathology.  Patient was noted to have mild aortic root dilatation (4.2 cm).\par \par

## 2022-06-14 ENCOUNTER — APPOINTMENT (OUTPATIENT)
Dept: CARDIOLOGY | Facility: CLINIC | Age: 67
End: 2022-06-14
Payer: MEDICAID

## 2022-06-14 VITALS
DIASTOLIC BLOOD PRESSURE: 79 MMHG | RESPIRATION RATE: 18 BRPM | BODY MASS INDEX: 27.82 KG/M2 | TEMPERATURE: 97.8 F | SYSTOLIC BLOOD PRESSURE: 121 MMHG | WEIGHT: 190 LBS | OXYGEN SATURATION: 96 % | HEART RATE: 79 BPM

## 2022-06-14 PROCEDURE — 99214 OFFICE O/P EST MOD 30 MIN: CPT

## 2022-06-14 PROCEDURE — 93306 TTE W/DOPPLER COMPLETE: CPT

## 2022-06-14 NOTE — PHYSICAL EXAM
[General Appearance - Well Developed] : well developed [Normal Appearance] : normal appearance [Well Groomed] : well groomed [General Appearance - Well Nourished] : well nourished [No Deformities] : no deformities [General Appearance - In No Acute Distress] : no acute distress [Normal Conjunctiva] : the conjunctiva exhibited no abnormalities [Eyelids - No Xanthelasma] : the eyelids demonstrated no xanthelasmas [Normal Oral Mucosa] : normal oral mucosa [No Oral Pallor] : no oral pallor [No Oral Cyanosis] : no oral cyanosis [Normal Jugular Venous A Waves Present] : normal jugular venous A waves present [Normal Jugular Venous V Waves Present] : normal jugular venous V waves present [No Jugular Venous Yang A Waves] : no jugular venous yang A waves [Respiration, Rhythm And Depth] : normal respiratory rhythm and effort [Exaggerated Use Of Accessory Muscles For Inspiration] : no accessory muscle use [Auscultation Breath Sounds / Voice Sounds] : lungs were clear to auscultation bilaterally [Heart Sounds] : normal S1 and S2 [Murmurs] : no murmurs present [Edema] : no peripheral edema present [Arterial Pulses Normal] : the arterial pulses were normal [Irregularly Irregular] : the rhythm was irregularly irregular [Bowel Sounds] : normal bowel sounds [Abdomen Soft] : soft [Abdomen Tenderness] : non-tender [Abnormal Walk] : normal gait [Nail Clubbing] : no clubbing of the fingernails [Cyanosis, Localized] : no localized cyanosis [Petechial Hemorrhages (___cm)] : no petechial hemorrhages [] : no ischemic changes [Oriented To Time, Place, And Person] : oriented to person, place, and time [Affect] : the affect was normal [Mood] : the mood was normal [No Anxiety] : not feeling anxious

## 2023-02-25 NOTE — REASON FOR VISIT
[FreeTextEntry1] : 66-year-old male smoker with AFIB, stroke in 1/2018, aortic aneurysm (4.3 cm), HTN, HLD, presents for followup.  \par \par Patient was last seen on 3/12/21 for followup.  I advised patient to undergo an echocardiogram.  Patient underwent an echocardiogram and it showed normal LV function, mild aortic root dilatation (4.3 cm), mild dilatation of the ascending aorta. (4.1 cm), without significant valvular pathology. \par \par Patient is on Xarelto 15 mg for stroke prevention.  He is on Metoprolol  mg,  Amlodipine 10 mg for HTN. \par

## 2023-02-25 NOTE — CARDIOLOGY SUMMARY
[___] : [unfilled] [de-identified] : Patient underwent an echocardiogram 5/16/19 and it showed normal LV function, mild aortic root dilatation (4.2 cm), without significant valvular pathology.

## 2023-02-25 NOTE — DISCUSSION/SUMMARY
[FreeTextEntry1] : 66-year-old male smoker with AFIB, stroke in 1/2018, aortic aneurysm (4.3 cm), HTN, HLD, presents for followup.  \par \par Patient was last seen on 3/12/21 for followup.  I advised patient to undergo an echocardiogram.  Patient underwent an echocardiogram and it showed normal LV function, mild aortic root dilatation (4.3 cm), mild dilatation of the ascending aorta. (4.1 cm), without significant valvular pathology. \par \par Patient is on Xarelto 15 mg for stroke prevention.  He is on Metoprolol  mg,  Amlodipine 10 mg for HTN. \par \par 6/14/22 - Patient has been stable.  Patient denies CP, SOB, palpitations, or lightheadedness.  Patient reports that he is taking Amlodipine 10 mg and no longer taking Losartan HCTZ.  ECG showed AFIB.  I advised patient to undergo an echocardiogram.  Patient underwent an echocardiogram and it showed normal LV function, stable mild aortic root dilatation (4.3 cm), stable mild dilatation of the ascending aorta (4.0 cm), without significant valvular pathology.  I advised patient to continue current medications.  FU 1 year.  \par \par (1) PAF, stroke - Patient has been stable.  Patient is in AFIB today.  I advised patient to continue Xarelto 15 mg for stroke prevention.  I advised patient to continue Metoprolol  mg for rate control.\par \par (2) Aortic aneurysm - Patient has been stable.  Patient underwent an echocardiogram and it showed normal LV function, mild aortic root dilatation (4.3 cm), mild dilatation of the ascending aorta. (4.0 cm), without significant valvular pathology.  I advised patient to keep BP under good control.  Patient should have a followup echo in 1 year. \par \par (3) HTN - His BP was good today.  I advised patient to continue Metoprolol  mg, Amlodipine 10 mg.\par \par (4) Followup - 1 year.

## 2023-02-25 NOTE — HISTORY OF PRESENT ILLNESS
[FreeTextEntry1] : 6/14/22 - Patient has been stable.  Patient denies CP, SOB, palpitations, or lightheadedness.  Patient reports that he is taking Amlodipine 10 mg and no longer taking Losartan HCTZ.  ECG showed AFIB.  I advised patient to undergo an echocardiogram.  Patient underwent an echocardiogram and it showed normal LV function, stable mild aortic root dilatation (4.3 cm), stable mild dilatation of the ascending aorta (4.0 cm), without significant valvular pathology.  I advised patient to continue current medications.  FU 1 year.  \par \par 3/12/21- Patient contracted COVID19 and went to hospital ER on  2/23/21 but was not admitted. Patient has AFIB and is on Xarelto 15 mg.  Patient denies CP, SOB, palpitations, or lightheadedness.  He is on Metoprolol  mg, Losartan HCTZ 100-12.5 mg, Simvastatin 20 mg. I advised patient to undergo an echocardiogram.  Patient underwent an echocardiogram and it showed normal LV function, mild aortic root dilatation (4.3 cm), mild dilatation of the ascending aorta. (4.1 cm), without significant valvular pathology. \par \par 5/16/19 - Patient stopped taking Warfarin 6 months ago and is only on Aspirin now. Patient denies CP, SOB, palpitations, or lightheadedness. I advised patient to undergo an echocardiogram.  Patient underwent an echocardiogram and it showed normal LV function, mild aortic root dilatation (4.2 cm), without significant valvular pathology.

## 2023-08-17 ENCOUNTER — APPOINTMENT (OUTPATIENT)
Dept: CARDIOLOGY | Facility: CLINIC | Age: 68
End: 2023-08-17
Payer: MEDICARE

## 2023-08-17 VITALS
RESPIRATION RATE: 18 BRPM | SYSTOLIC BLOOD PRESSURE: 139 MMHG | DIASTOLIC BLOOD PRESSURE: 87 MMHG | OXYGEN SATURATION: 97 % | BODY MASS INDEX: 26.36 KG/M2 | WEIGHT: 180 LBS | HEART RATE: 67 BPM | TEMPERATURE: 96.8 F

## 2023-08-17 DIAGNOSIS — I63.9 CEREBRAL INFARCTION, UNSPECIFIED: ICD-10-CM

## 2023-08-17 PROCEDURE — 93306 TTE W/DOPPLER COMPLETE: CPT

## 2023-08-17 PROCEDURE — 99214 OFFICE O/P EST MOD 30 MIN: CPT

## 2023-08-17 NOTE — CARDIOLOGY SUMMARY
[de-identified] : Patient underwent an echocardiogram 5/16/19 and it showed normal LV function, mild aortic root dilatation (4.2 cm), without significant valvular pathology.  [___] : [unfilled]

## 2023-08-17 NOTE — REASON FOR VISIT
[FreeTextEntry1] : 66-year-old male smoker with AFIB, stroke in 1/2018, aortic aneurysm (4.3 cm), HTN, HLD, presents for followup.    Patient was last seen on 6/14/22 - Patient has been stable.  Patient denies CP, SOB, palpitations, or lightheadedness.  Patient reports that he is taking Amlodipine 10 mg and no longer taking Losartan HCTZ.  ECG showed AFIB.  I advised patient to undergo an echocardiogram.  Patient underwent an echocardiogram and it showed normal LV function, stable mild aortic root dilatation (4.3 cm), stable mild dilatation of the ascending aorta (4.0 cm), without significant valvular pathology.  I advised patient to continue current medications.  FU 1 year.    Patient is on Xarelto 15 mg for stroke prevention.  He is on Metoprolol  mg,  Amlodipine 10 mg for HTN.   Patient underwent an echocardiogram 3/12/21 and it showed normal LV function, mild aortic root dilatation (4.3 cm), mild dilatation of the ascending aorta. (4.1 cm), without significant valvular pathology.

## 2023-08-17 NOTE — HISTORY OF PRESENT ILLNESS
[FreeTextEntry1] : 6/14/22 - Patient has been stable.  Patient denies CP, SOB, palpitations, or lightheadedness.  Patient reports that he is taking Amlodipine 10 mg and no longer taking Losartan HCTZ.  ECG showed AFIB.  I advised patient to undergo an echocardiogram.  Patient underwent an echocardiogram and it showed normal LV function, stable mild aortic root dilatation (4.3 cm), stable mild dilatation of the ascending aorta (4.0 cm), without significant valvular pathology.  I advised patient to continue current medications.  FU 1 year.    3/12/21- Patient contracted COVID19 and went to hospital ER on  2/23/21 but was not admitted. Patient has AFIB and is on Xarelto 15 mg.  Patient denies CP, SOB, palpitations, or lightheadedness.  He is on Metoprolol  mg, Losartan HCTZ 100-12.5 mg, Simvastatin 20 mg. I advised patient to undergo an echocardiogram.  Patient underwent an echocardiogram and it showed normal LV function, mild aortic root dilatation (4.3 cm), mild dilatation of the ascending aorta. (4.1 cm), without significant valvular pathology.   5/16/19 - Patient stopped taking Warfarin 6 months ago and is only on Aspirin now. Patient denies CP, SOB, palpitations, or lightheadedness. I advised patient to undergo an echocardiogram.  Patient underwent an echocardiogram and it showed normal LV function, mild aortic root dilatation (4.2 cm), without significant valvular pathology.

## 2023-08-30 PROBLEM — I63.9 STROKE: Status: ACTIVE | Noted: 2018-05-28

## 2024-06-22 PROBLEM — I48.0 PAROXYSMAL ATRIAL FIBRILLATION: Status: ACTIVE | Noted: 2018-05-16

## 2024-06-22 NOTE — PHYSICAL EXAM
[General Appearance - Well Developed] : well developed [Normal Appearance] : normal appearance [Well Groomed] : well groomed [General Appearance - Well Nourished] : well nourished [No Deformities] : no deformities [General Appearance - In No Acute Distress] : no acute distress [Normal Conjunctiva] : the conjunctiva exhibited no abnormalities [Eyelids - No Xanthelasma] : the eyelids demonstrated no xanthelasmas [Normal Oral Mucosa] : normal oral mucosa [No Oral Pallor] : no oral pallor [No Oral Cyanosis] : no oral cyanosis [Normal Jugular Venous A Waves Present] : normal jugular venous A waves present [Normal Jugular Venous V Waves Present] : normal jugular venous V waves present [No Jugular Venous Yang A Waves] : no jugular venous yang A waves [Respiration, Rhythm And Depth] : normal respiratory rhythm and effort [Exaggerated Use Of Accessory Muscles For Inspiration] : no accessory muscle use [Auscultation Breath Sounds / Voice Sounds] : lungs were clear to auscultation bilaterally [Heart Sounds] : normal S1 and S2 [Murmurs] : no murmurs present [Arterial Pulses Normal] : the arterial pulses were normal [Edema] : no peripheral edema present [Irregularly Irregular] : the rhythm was irregularly irregular [Bowel Sounds] : normal bowel sounds [Abdomen Soft] : soft [Abdomen Tenderness] : non-tender [Nail Clubbing] : no clubbing of the fingernails [Abnormal Walk] : normal gait [Cyanosis, Localized] : no localized cyanosis [Petechial Hemorrhages (___cm)] : no petechial hemorrhages [] : no ischemic changes [Oriented To Time, Place, And Person] : oriented to person, place, and time [Affect] : the affect was normal [Mood] : the mood was normal [No Anxiety] : not feeling anxious

## 2024-06-24 ENCOUNTER — APPOINTMENT (OUTPATIENT)
Dept: CARDIOLOGY | Facility: CLINIC | Age: 69
End: 2024-06-24
Payer: MEDICARE

## 2024-06-24 VITALS
WEIGHT: 187 LBS | DIASTOLIC BLOOD PRESSURE: 84 MMHG | SYSTOLIC BLOOD PRESSURE: 146 MMHG | RESPIRATION RATE: 18 BRPM | OXYGEN SATURATION: 95 % | HEART RATE: 80 BPM | BODY MASS INDEX: 27.38 KG/M2

## 2024-06-24 DIAGNOSIS — I71.9 AORTIC ANEURYSM OF UNSPECIFIED SITE, W/OUT RUPTURE: ICD-10-CM

## 2024-06-24 DIAGNOSIS — I48.0 PAROXYSMAL ATRIAL FIBRILLATION: ICD-10-CM

## 2024-06-24 DIAGNOSIS — I10 ESSENTIAL (PRIMARY) HYPERTENSION: ICD-10-CM

## 2024-06-24 PROCEDURE — 99214 OFFICE O/P EST MOD 30 MIN: CPT

## 2024-06-24 PROCEDURE — 93306 TTE W/DOPPLER COMPLETE: CPT

## 2024-06-24 NOTE — REASON FOR VISIT
[FreeTextEntry1] : 68 year-old male smoker with AFIB, stroke in 1/2018, aortic aneurysm (4.3 cm), HTN, HLD, presents for followup.    Patient was last seen on 8/17/23 - Patient has been stable.  Patient denies CP, SOB, palpitations, or lightheadedness.  Patient is on Xarelto 15 mg for stroke prevention.  He is on Metoprolol  mg,  Amlodipine 10 mg for HTN.  /87 HR 67.  ECG showed AFIB.  I advised patient to undergo an echocardiogram.  Patient underwent an echocardiogram and it showed normal LV function, stable mild aortic root dilatation (4.3 cm), stable mild dilatation of the ascending aorta (4.0 cm), without significant valvular pathology.  I advised patient to continue current medications.    Patient is on Xarelto 15 mg for stroke prevention.  He is on Metoprolol  mg, Amlodipine 10 mg for HTN.   Patient underwent an echocardiogram 6/14/22 and it showed normal LV function, stable mild aortic root dilatation (4.3 cm), stable mild dilatation of the ascending aorta (4.0 cm), without significant valvular pathology.    Patient underwent an echocardiogram 3/12/21 and it showed normal LV function, mild aortic root dilatation (4.3 cm), mild dilatation of the ascending aorta. (4.1 cm), without significant valvular pathology.

## 2024-06-24 NOTE — HISTORY OF PRESENT ILLNESS
[FreeTextEntry1] : 6/24/24 - Patient has been stable.  Patient denies CP, SOB, palpitations.  Patient reports rare postural lightheadedness.  Patient denies h/o syncope.  Patient is on Xarelto 15 mg for stroke prevention.  He is on Metoprolol  mg,  Amlodipine 10 mg for HTN.   /84 HR 80.  Exam showed irregularly irregular rate and rhythm.  I advised patient to undergo an echocardiogram.   (1) AFIB, stroke - Patient has been stable.  I advised patient to continue Xarelto 15 mg for stroke prevention.  I advised patient to continue Metoprolol  mg for rate control.  (2) Aortic aneurysm - Patient has been stable.  Patient underwent an echocardiogram and it showed normal LV function, mild aortic root dilatation (4.3 cm), mild dilatation of the ascending aorta. (4.0 cm), without significant valvular pathology.  I advised patient to keep BP under good control.  Patient should have a followup echo in 1 year.  (3) HTN - His BP was borderline today.  I advised patient to continue Metoprolol  mg and Amlodipine 10 mg.  Losartan can be added if BP remains elevated.  (4) Followup - 1 year.    8/17/23 - Patient has been stable.  Patient denies CP, SOB, palpitations, or lightheadedness.  Patient is on Xarelto 15 mg for stroke prevention.  He is on Metoprolol  mg,  Amlodipine 10 mg for HTN.  /87 HR 67.  ECG showed AFIB.  I advised patient to undergo an echocardiogram.  Patient underwent an echocardiogram and it showed normal LV function, stable mild aortic root dilatation (4.3 cm), stable mild dilatation of the ascending aorta (4.0 cm), without significant valvular pathology.  I advised patient to continue current medications.    6/14/22 - Patient has been stable.  Patient denies CP, SOB, palpitations, or lightheadedness.  Patient reports that he is taking Amlodipine 10 mg and no longer taking Losartan HCTZ.  ECG showed AFIB.  I advised patient to undergo an echocardiogram.  Patient underwent an echocardiogram and it showed normal LV function, stable mild aortic root dilatation (4.3 cm), stable mild dilatation of the ascending aorta (4.0 cm), without significant valvular pathology.  I advised patient to continue current medications.  FU 1 year.    3/12/21- Patient contracted COVID19 and went to hospital ER on  2/23/21 but was not admitted. Patient has AFIB and is on Xarelto 15 mg.  Patient denies CP, SOB, palpitations, or lightheadedness.  He is on Metoprolol  mg, Losartan HCTZ 100-12.5 mg, Simvastatin 20 mg. I advised patient to undergo an echocardiogram.  Patient underwent an echocardiogram and it showed normal LV function, mild aortic root dilatation (4.3 cm), mild dilatation of the ascending aorta. (4.1 cm), without significant valvular pathology.   5/16/19 - Patient stopped taking Warfarin 6 months ago and is only on Aspirin now. Patient denies CP, SOB, palpitations, or lightheadedness. I advised patient to undergo an echocardiogram.  Patient underwent an echocardiogram and it showed normal LV function, mild aortic root dilatation (4.2 cm), without significant valvular pathology.  -

## 2024-06-24 NOTE — CARDIOLOGY SUMMARY
[___] : [unfilled] [de-identified] : Patient underwent an echocardiogram 5/16/19 and it showed normal LV function, mild aortic root dilatation (4.2 cm), without significant valvular pathology.

## 2025-07-17 ENCOUNTER — APPOINTMENT (OUTPATIENT)
Dept: CARDIOLOGY | Facility: CLINIC | Age: 70
End: 2025-07-17

## 2025-07-17 VITALS
BODY MASS INDEX: 27.38 KG/M2 | DIASTOLIC BLOOD PRESSURE: 82 MMHG | RESPIRATION RATE: 18 BRPM | WEIGHT: 187 LBS | HEART RATE: 76 BPM | OXYGEN SATURATION: 97 % | SYSTOLIC BLOOD PRESSURE: 136 MMHG

## 2025-07-17 PROCEDURE — 93306 TTE W/DOPPLER COMPLETE: CPT

## 2025-08-05 ENCOUNTER — NON-APPOINTMENT (OUTPATIENT)
Age: 70
End: 2025-08-05